# Patient Record
Sex: MALE
[De-identification: names, ages, dates, MRNs, and addresses within clinical notes are randomized per-mention and may not be internally consistent; named-entity substitution may affect disease eponyms.]

---

## 2020-05-27 ENCOUNTER — NURSE TRIAGE (OUTPATIENT)
Dept: OTHER | Facility: CLINIC | Age: 50
End: 2020-05-27

## 2020-05-27 NOTE — TELEPHONE ENCOUNTER
Reason for Disposition   Direct blow to area (e.g., baseball, kicked, bicycle straddle injury)    Answer Assessment - Initial Assessment Questions  1. MECHANISM: \"How did the injury happen? \" (Injuries to the penis can occur during sexual intercourse or masturbation; the caller may not be willing to mention this)       During sexual intercourse  2. ONSET: \"When did the injury happen? \" (Minutes or hours ago)       2 months ago  3. LOCATION: \"What part of the genitals are injured? \"       Penis  4. APPEARANCE of INJURY: \"What does the injury look like? \"       Flat area on penis  5. BLEEDING: \"Is the injury still bleeding? \" If so, ask: \"Is it difficult to stop? \"       No  6. SIZE: For cuts, bruises, or swelling, ask: \"How large is it? \" (e.g., inches or centimeters)       No  7. PAIN: \"Is it painful? \" If so, ask: \"How bad is the pain? \"    (e.g., Scale 1-10; or mild, moderate, severe)      Yes when erect. 6  8. TETANUS: For any breaks in the skin, ask: \"When was the last tetanus booster? \"      NA  9. OTHER SYMPTOMS: \"Do you have any other symptoms? \" (e.g., blood from tip of penis, bloody urine)     No    Protocols used: GENITAL INJURY - MALE-ADULT-    Patient tried to call PCP yesterday and today and could not get through. Left message. I advised patient that since his pain is not severe and only intermittent. To wait another day for PCP to call back or go into office personally . Patient agreeable. Advised to call back if symptoms worsen or go to ED with severe pain.